# Patient Record
(demographics unavailable — no encounter records)

---

## 2025-05-22 NOTE — HISTORY OF PRESENT ILLNESS
[de-identified] : Mr. ELIO RIZVI is a 33-year-old male with history of asthma presenting today for follow up.

## 2025-05-22 NOTE — HISTORY OF PRESENT ILLNESS
[de-identified] : Mr. ELIO RIZVI is a 33-year-old male with history of asthma presenting today for follow up.

## 2025-07-03 NOTE — HISTORY OF PRESENT ILLNESS
[de-identified] : Mr. ELIO RIZVI is a 34-year-old male with history of asthma presenting today for CPE.